# Patient Record
Sex: FEMALE | Race: WHITE | NOT HISPANIC OR LATINO | Employment: FULL TIME | URBAN - NONMETROPOLITAN AREA
[De-identification: names, ages, dates, MRNs, and addresses within clinical notes are randomized per-mention and may not be internally consistent; named-entity substitution may affect disease eponyms.]

---

## 2017-03-24 ENCOUNTER — OFFICE VISIT (OUTPATIENT)
Dept: URGENT CARE | Facility: PHYSICIAN GROUP | Age: 27
End: 2017-03-24
Payer: COMMERCIAL

## 2017-03-24 VITALS
HEIGHT: 64 IN | HEART RATE: 83 BPM | WEIGHT: 125 LBS | TEMPERATURE: 99 F | SYSTOLIC BLOOD PRESSURE: 108 MMHG | BODY MASS INDEX: 21.34 KG/M2 | OXYGEN SATURATION: 98 % | DIASTOLIC BLOOD PRESSURE: 74 MMHG

## 2017-03-24 DIAGNOSIS — R11.0 NAUSEA: ICD-10-CM

## 2017-03-24 DIAGNOSIS — R19.7 NAUSEA VOMITING AND DIARRHEA: Primary | ICD-10-CM

## 2017-03-24 DIAGNOSIS — R11.2 NAUSEA VOMITING AND DIARRHEA: Primary | ICD-10-CM

## 2017-03-24 LAB
APPEARANCE UR: NORMAL
BILIRUB UR STRIP-MCNC: NORMAL MG/DL
COLOR UR AUTO: NORMAL
GLUCOSE UR STRIP.AUTO-MCNC: NORMAL MG/DL
INT CON NEG: NORMAL
INT CON POS: NORMAL
KETONES UR STRIP.AUTO-MCNC: NORMAL MG/DL
LEUKOCYTE ESTERASE UR QL STRIP.AUTO: NORMAL
NITRITE UR QL STRIP.AUTO: NORMAL
PH UR STRIP.AUTO: 6 [PH] (ref 5–8)
POC URINE PREGNANCY TEST: NEGATIVE
PROT UR QL STRIP: NORMAL MG/DL
RBC UR QL AUTO: NORMAL
SP GR UR STRIP.AUTO: 1.02
UROBILINOGEN UR STRIP-MCNC: NORMAL MG/DL

## 2017-03-24 PROCEDURE — 81025 URINE PREGNANCY TEST: CPT | Performed by: PHYSICIAN ASSISTANT

## 2017-03-24 PROCEDURE — 99214 OFFICE O/P EST MOD 30 MIN: CPT | Performed by: PHYSICIAN ASSISTANT

## 2017-03-24 PROCEDURE — 81002 URINALYSIS NONAUTO W/O SCOPE: CPT | Performed by: PHYSICIAN ASSISTANT

## 2017-03-24 RX ORDER — ONDANSETRON 4 MG/1
4 TABLET, FILM COATED ORAL EVERY 8 HOURS PRN
Qty: 20 TAB | Refills: 0 | Status: SHIPPED | OUTPATIENT
Start: 2017-03-24 | End: 2018-03-15

## 2017-03-24 RX ORDER — LOPERAMIDE HYDROCHLORIDE 2 MG/1
2 TABLET ORAL 4 TIMES DAILY PRN
COMMUNITY
End: 2018-03-15

## 2017-03-24 NOTE — MR AVS SNAPSHOT
"        Isabella Regalado   3/24/2017 11:20 AM   Office Visit   MRN: [id number]    Department:  [facility]   Dept Phone:  [phone]    Description:  Female : 1990   Provider:  Jan Muniz PA-C           Reason for Visit     Abdominal Pain abd pain, diarrhea, nausea x3days      Allergies as of 3/24/2017     Allergen Noted Reactions    Acyclovir 2016       Pcn [Penicillins] 2016       Tetracycline 2016         You were diagnosed with     Nausea   [892149]         Vital Signs     Blood Pressure Pulse Temperature Height Weight Body Mass Index    108/74 mmHg 83 37.2 °C (99 °F) 1.626 m (5' 4.02\") 56.7 kg (125 lb) 21.45 kg/m2    Oxygen Saturation Smoking Status                98% Never Smoker           Basic Information     Date Of Birth Sex Race Ethnicity Preferred Language    1990 Female White Non- English      Health Maintenance        Date Due Completion Dates    IMM HEP B VACCINE (1 of 3 - Primary Series) 1989 ---    IMM HEP A VACCINE (1 of 2 - Standard Series) 1990 ---    IMM VARICELLA (CHICKENPOX) VACCINE (1 of 2 - 2 Dose Adolescent Series) 2002 ---    IMM DTaP/Tdap/Td Vaccine (1 - Tdap) 2008 ---    PAP SMEAR 2010 ---    IMM INFLUENZA (1) 2016 ---            Current Immunizations     No immunizations on file.      Below and/or attached are the medications your provider expects you to take. Review all of your home medications and newly ordered medications with your provider and/or pharmacist. Follow medication instructions as directed by your provider and/or pharmacist. Please keep your medication list with you and share with your provider. Update the information when medications are discontinued, doses are changed, or new medications (including over-the-counter products) are added; and carry medication information at all times in the event of emergency situations     Allergies:  ACYCLOVIR - (reactions not documented)     PCN - " (reactions not documented)     TETRACYCLINE - (reactions not documented)               Medications  Valid as of: March 24, 2017 - 12:09 PM    Generic Name Brand Name Tablet Size Instructions for use    Azithromycin (Tab) ZITHROMAX 250 MG Take as directed        Bismuth Subsalicylate   Take  by mouth.        Etonogestrel-Ethinyl Estradiol   Insert  in vagina.        Loperamide HCl (Tab) IMODIUM A-D 2 MG Take 2 mg by mouth 4 times a day as needed for Diarrhea.        Pseudoeph-Doxylamine-DM-APAP   Take  by mouth.        Pseudoephedrine-APAP-DM   Take  by mouth.        .                 Medicines prescribed today were sent to:     [facility] [address] [place] [address], [place] - [address] [facility]    [address] [facility] [place] [address]    Phone: [phone] Fax: [phone]    Open 24 Hours?: No      Medication refill instructions:       If your prescription bottle indicates you have medication refills left, it is not necessary to call your provider’s office. Please contact your pharmacy and they will refill your medication.    If your prescription bottle indicates you do not have any refills left, you may request refills at any time through one of the following ways: The online [employer] system (except Urgent Care), by calling your provider’s office, or by asking your pharmacy to contact your provider’s office with a refill request. Medication refills are processed only during regular business hours and may not be available until the next business day. Your provider may request additional information or to have a follow-up visit with you prior to refilling your medication.   *Please Note: Medication refills are assigned a new Rx number when refilled electronically. Your pharmacy may indicate that no refills were authorized even though a new prescription for the same medication is available at the pharmacy. Please request the medicine by name with the pharmacy before contacting your provider for a refill.           [employer]  Access Code: Activation code not generated  Current MyChart Status: Active

## 2017-03-24 NOTE — PROGRESS NOTES
Chief Complaint   Patient presents with   • Abdominal Pain     abd pain, diarrhea, nausea x3days       HISTORY OF PRESENT ILLNESS: Patient is a 26 y.o. female who presents today because she has a two-day history of nausea, vomiting, diarrhea, some mild body aches, low-grade fevers. She has been able to eat and drink, but only very small sips of water and very small portions of food. This started last 2 days, her last episode of emesis was several hours ago, her diarrhea is 5-7 times daily, described as watery, brown stool without any blood or mucus. She tried some Imodium and some Pepto-Bismol and that did not help    There are no active problems to display for this patient.      Allergies:Acyclovir; Pcn; and Tetracycline    Current Outpatient Prescriptions Ordered in [facility]   Medication Sig Dispense Refill   • loperamide (IMODIUM A-D) 2 MG tablet Take 2 mg by mouth 4 times a day as needed for Diarrhea.     • Bismuth Subsalicylate (PEPTO-BISMOL PO) Take  by mouth.     • Etonogestrel-Ethinyl Estradiol (NUVARING VA) Insert  in vagina.     • ondansetron (ZOFRAN) 4 MG Tab tablet Take 1 Tab by mouth every 8 hours as needed for Nausea/Vomiting. 20 Tab 0   • Pseudoeph-Doxylamine-DM-APAP (NYQUIL PO) Take  by mouth.     • Pseudoephedrine-APAP-DM (DAYQUIL PO) Take  by mouth.     • azithromycin (ZITHROMAX) 250 MG Tab Take as directed (Patient not taking: Reported on 3/24/2017) 6 Tab 0     No current Epic-ordered facility-administered medications on file.       No past medical history on file.    Social History   Substance Use Topics   • Smoking status: Never Smoker    • Smokeless tobacco: Never Used   • Alcohol Use: No       No family status information on file.   No family history on file.    ROS:  Review of Systems   Constitutional: Positive for fever, chills, myalgias and malaise/fatigue.   HENT: Negative for ear pain, nosebleeds, congestion, sore throat and neck pain.    Eyes: Negative for blurred vision.   Respiratory:  "Negative for cough, sputum production, shortness of breath and wheezing.    Cardiovascular: Negative for chest pain, palpitations, orthopnea and leg swelling.   Gastrointestinal: Negative for heartburn, positive for diarrhea, nausea, vomiting and generalized cramping abdominal pain.   Genitourinary: Negative for dysuria, urgency and frequency.     Exam:  Blood pressure 108/74, pulse 83, temperature 37.2 °C (99 °F), height 1.626 m (5' 4.02\"), weight 56.7 kg (125 lb), SpO2 98 %.  General:  Well nourished, well developed female in NAD  Head:Normocephalic, atraumatic  Eyes: PERRLA, EOM within normal limits, no conjunctival injection, no scleral icterus, visual fields and acuity grossly intact.  Nose: Symmetrical without tenderness, no discharge.  Mouth: reasonable hygiene, no erythema exudates or tonsillar enlargement.  Neck: no masses, range of motion within normal limits, no tracheal deviation. No obvious thyroid enlargement.  Pulmonary: chest is symmetrical with respiration, no wheezes, crackles, or rhonchi.  Cardiovascular: regular rate and rhythm without murmurs, rubs, or gallops.  Abdomen: Nondistended, bowel tones in all 4 quadrants, soft, mild generalized tenderness to palpation, no other tenderness to palpation, no organomegaly, rebound, referred rebound tenderness, no Heath's or McBurney's point tenderness.  Extremities: no clubbing, cyanosis, or edema.    Negative urine hCG. Urinalysis shows trace protein, large amount of blood, moderate amount of ketones.    Please note that this dictation was created using voice recognition software. I have made every reasonable attempt to correct obvious errors, but I expect that there are errors of grammar and possibly content that I did not discover before finalizing the note.    Assessment/Plan:  1. Nausea vomiting and diarrhea     2. Nausea  POCT Urinalysis    POCT Pregnancy    ondansetron (ZOFRAN) 4 MG Tab tablet    urine abnormalities can be explained by recent " emesis and illness. Discussed bland diet, small sips of water.    Followup with primary care in the next 7-10 days if not significantly improving, return to the urgent care or go to the emergency room sooner for any worsening of symptoms.

## 2017-03-24 NOTE — Clinical Note
March 24, 2017         Patient: Isabella Regalado   YOB: 1990   Date of Visit: 3/24/2017           To Whom it May Concern:    Isabella Regalado was seen in my clinic on 3/24/2017. She may return to work on 03/25/2017, if feeling better, otherwise on 3/27/17. Please excuse any absences due to illness    If you have any questions or concerns, please don't hesitate to call.        Sincerely,           Jan Muniz PA-C  Electronically Signed

## 2018-03-09 ENCOUNTER — OFFICE VISIT (OUTPATIENT)
Dept: URGENT CARE | Facility: PHYSICIAN GROUP | Age: 28
End: 2018-03-09
Payer: COMMERCIAL

## 2018-03-09 VITALS
HEIGHT: 64 IN | WEIGHT: 135 LBS | TEMPERATURE: 98 F | DIASTOLIC BLOOD PRESSURE: 80 MMHG | BODY MASS INDEX: 23.05 KG/M2 | HEART RATE: 101 BPM | OXYGEN SATURATION: 98 % | RESPIRATION RATE: 20 BRPM | SYSTOLIC BLOOD PRESSURE: 120 MMHG

## 2018-03-09 DIAGNOSIS — F41.9 ANXIETY: ICD-10-CM

## 2018-03-09 PROCEDURE — 99214 OFFICE O/P EST MOD 30 MIN: CPT | Performed by: PHYSICIAN ASSISTANT

## 2018-03-09 RX ORDER — HYDROXYZINE HYDROCHLORIDE 25 MG/1
25 TABLET, FILM COATED ORAL 3 TIMES DAILY PRN
Qty: 30 TAB | Refills: 0 | Status: SHIPPED | OUTPATIENT
Start: 2018-03-09 | End: 2018-05-07 | Stop reason: SDUPTHER

## 2018-03-09 NOTE — PROGRESS NOTES
Chief Complaint   Patient presents with   • Anxiety     x5days has OV 3/15       HISTORY OF PRESENT ILLNESS: Patient is a 27 y.o. female who presents today because she has anxiety. She has had it as a teenager and with her counseling but was doing quite well up until a few days ago when she started realizing that a recent job promotion was going to be a lot more responsibility. She has tearfulness, impending down, and shakiness. Denies any homicidal or suicidal ideation. She has not been taking any medications for her symptoms.    There are no active problems to display for this patient.      Allergies:Acyclovir; Pcn [penicillins]; and Tetracycline    Current Outpatient Prescriptions Ordered in [facility]   Medication Sig Dispense Refill   • MethylPREDNISolone Acetate (DEPO-MEDROL INJ) by Injection route.     • hydrOXYzine HCl (ATARAX) 25 MG Tab Take 1 Tab by mouth 3 times a day as needed. 30 Tab 0   • loperamide (IMODIUM A-D) 2 MG tablet Take 2 mg by mouth 4 times a day as needed for Diarrhea.     • Bismuth Subsalicylate (PEPTO-BISMOL PO) Take  by mouth.     • Etonogestrel-Ethinyl Estradiol (NUVARING VA) Insert  in vagina.     • ondansetron (ZOFRAN) 4 MG Tab tablet Take 1 Tab by mouth every 8 hours as needed for Nausea/Vomiting. 20 Tab 0   • Pseudoeph-Doxylamine-DM-APAP (NYQUIL PO) Take  by mouth.     • Pseudoephedrine-APAP-DM (DAYQUIL PO) Take  by mouth.     • azithromycin (ZITHROMAX) 250 MG Tab Take as directed (Patient not taking: Reported on 3/24/2017) 6 Tab 0     No current Epic-ordered facility-administered medications on file.        History reviewed. No pertinent past medical history.    Social History   Substance Use Topics   • Smoking status: Never Smoker   • Smokeless tobacco: Never Used   • Alcohol use No       No family status information on file.   History reviewed. No pertinent family history.    ROS:  Review of Systems   Constitutional: Negative for fever, chills, weight loss and malaise/fatigue.   HENT:  "Negative for ear pain, nosebleeds, congestion, sore throat and neck pain.    Eyes: Negative for blurred vision.   Respiratory: Negative for cough, sputum production, shortness of breath and wheezing.    Cardiovascular: Negative for chest pain, positive for palpitations, orthopnea and leg swelling.   Gastrointestinal: Negative for heartburn, nausea, vomiting and abdominal pain.   Genitourinary: Negative for dysuria, urgency and frequency.     Exam:  Blood pressure 120/80, pulse (!) 101, temperature 36.7 °C (98 °F), resp. rate 20, height 1.626 m (5' 4.02\"), weight 61.2 kg (135 lb), SpO2 98 %, not currently breastfeeding.  General:  Well nourished, well developed female, tearful, in the office  Head:Normocephalic, atraumatic  Eyes: PERRLA, EOM within normal limits, no conjunctival injection, no scleral icterus, visual fields and acuity grossly intact.  Extremities: no clubbing, cyanosis, or edema.    Please note that this dictation was created using voice recognition software. I have made every reasonable attempt to correct obvious errors, but I expect that there are errors of grammar and possibly content that I did not discover before finalizing the note.    Assessment/Plan:  1. Anxiety  hydrOXYzine HCl (ATARAX) 25 MG Tab   . She has an appointment with her primary care provider in 6 days.    Followup with primary care in the next 7-10 days if not significantly improving, return to the urgent care or go to the emergency room sooner for any worsening of symptoms.       "

## 2018-03-15 ENCOUNTER — OFFICE VISIT (OUTPATIENT)
Dept: MEDICAL GROUP | Facility: PHYSICIAN GROUP | Age: 28
End: 2018-03-15
Payer: COMMERCIAL

## 2018-03-15 VITALS
HEIGHT: 64 IN | OXYGEN SATURATION: 97 % | HEART RATE: 74 BPM | RESPIRATION RATE: 18 BRPM | DIASTOLIC BLOOD PRESSURE: 66 MMHG | TEMPERATURE: 98.6 F | SYSTOLIC BLOOD PRESSURE: 136 MMHG | BODY MASS INDEX: 23.58 KG/M2 | WEIGHT: 138.1 LBS

## 2018-03-15 DIAGNOSIS — Z82.79 FAMILY HISTORY OF CONGENITAL HEART DISEASE IN FATHER: ICD-10-CM

## 2018-03-15 DIAGNOSIS — F41.9 ANXIETY: ICD-10-CM

## 2018-03-15 DIAGNOSIS — Z30.09 BIRTH CONTROL COUNSELING: ICD-10-CM

## 2018-03-15 PROCEDURE — 99214 OFFICE O/P EST MOD 30 MIN: CPT | Performed by: NURSE PRACTITIONER

## 2018-03-15 RX ORDER — MEDROXYPROGESTERONE ACETATE 150 MG/ML
150 INJECTION, SUSPENSION INTRAMUSCULAR ONCE
Qty: 1 ML | Refills: 0
Start: 2018-03-15 | End: 2018-07-23 | Stop reason: SDUPTHER

## 2018-03-15 RX ORDER — CITALOPRAM HYDROBROMIDE 10 MG/1
10 TABLET ORAL DAILY
Qty: 30 TAB | Refills: 2 | Status: SHIPPED | OUTPATIENT
Start: 2018-03-15 | End: 2018-05-07

## 2018-03-15 ASSESSMENT — PATIENT HEALTH QUESTIONNAIRE - PHQ9: CLINICAL INTERPRETATION OF PHQ2 SCORE: 0

## 2018-03-15 NOTE — PROGRESS NOTES
[facility]  Primary Care Office Visit - Problem-Oriented        History:     Isabella Regalado is a 27 y.o. female who is here today to discuss Anxiety and Establish Care      Anxiety  Patient is a 27-year-old female who is here for follow-up of anxiety. She was originally seen in the urgent care about a week ago and started on Atarax 25 mg as needed, she does report that this helps to lessen the panic and physical manifestations of her anxiety. She has had anxiety for most of her life, first diagnosed as a teen, her mother also has anxiety and bipolar depression. She saw a counselor in high school, and this did prove beneficial and she is interested in seeking out a counselor again. Her anxiety affects her sleep and her ability to work, it also affects her relationship with her spouse. She thinks that her new job promotion has heightened her anxiety. Occasionally has palpitations during episodes of panic. She denies syncope, chest pain. She denies suicidal or homicidal ideations. She denies episodes of extended week intervals and not needing sleep, excessive spending, risky sexual behavior. We did discuss referral to behavioral health as well as starting Celexa 10 mg daily, continue Atarax as needed for panic.      Family history of congenital heart disease in father  Patient reports a strong family history of congenital heart defect, coarctation of the aorta affecting only the males in the family, she has not had echo and we did discuss a referral for this today. She denies syncope, occasionally she does have palpitations though she thinks that these are triggered by her anxiety alone, she has no chest pain, extreme fatigue or exertional dyspnea.        Past Medical History:   Diagnosis Date   • Anxiety      Past Surgical History:   Procedure Laterality Date   • DENTAL EXTRACTION(S)       Social History     Social History   • Marital status:      Spouse name: N/A   • Number of children: N/A   •  "Years of education: N/A     Occupational History   • Not on file.     Social History Main Topics   • Smoking status: Never Smoker   • Smokeless tobacco: Never Used   • Alcohol use No   • Drug use: No   • Sexual activity: Yes     Birth control/ protection: Injection     Other Topics Concern   • Not on file     Social History Narrative   • No narrative on file     History   Smoking Status   • Never Smoker   Smokeless Tobacco   • Never Used     Family History   Problem Relation Age of Onset   • Psychiatry Mother      bipolar depression    • Other Mother      parkinsons    • Heart Disease Father      26 - congenital heart defect    • Other Sister      marfans    • Other Brother      marfans      Allergies   Allergen Reactions   • Acyclovir    • Pcn [Penicillins]    • Tetracycline        Problem List:     Patient Active Problem List    Diagnosis Date Noted   • Anxiety 03/15/2018   • Family history of congenital heart disease in father 03/15/2018         Medications:     Current Outpatient Prescriptions:   •  citalopram (CELEXA) 10 MG tablet, Take 1 Tab by mouth every day., Disp: 30 Tab, Rfl: 2  •  medroxyPROGESTERone (DEPO-PROVERA) 150 MG/ML Suspension, 1 mL by Intramuscular route Once for 1 dose., Disp: 1 mL, Rfl: 0  •  MethylPREDNISolone Acetate (DEPO-MEDROL INJ), by Injection route., Disp: , Rfl:   •  hydrOXYzine HCl (ATARAX) 25 MG Tab, Take 1 Tab by mouth 3 times a day as needed., Disp: 30 Tab, Rfl: 0      Review of Systems:     Pertinent positives as per HPI, all other systems reviewed and WNL     Physical Assessment:     VS: /66   Pulse 74   Temp 37 °C (98.6 °F)   Resp 18   Ht 1.626 m (5' 4.02\")   Wt 62.6 kg (138 lb 1.6 oz)   SpO2 97%   Breastfeeding? No   BMI 23.69 kg/m²     General: Well-developed, well-nourished, female     Head: PERRL, EOMI. Normocephalic. No facial asymmetry noted.  Neck: Neck supple, no thyromegaly  Cardiovasc: RRR, no MRG. No thrills or bruits. Pulses 2+ and symmetric at all " distal extremities.  Pulmonary: Lungs clear bilaterally.  Normal respiratory effort. No wheeze or crackles.   Neuro: Alert and oriented  Skin:No rashes noted. Skin warm, dry, intact    Psych: Dressed appropriately for the weather, pleasant and conversant.  Affect, mood & judgment appropriate.      Recent Labs & Imaging:   ADRIAN-7 score 13   MDQ highly suggestive of mood disorder    Assessment/Plan:   Isabella was seen today for anxiety and establish care.    Diagnoses and all orders for this visit:    Anxiety, uncontrolled   - Discussed generalized anxiety disorder with the patient, recommend Celexa 10 mg daily, Atarax as needed. I have also placed a referral to behavioral health for counseling. Her MDQ is suggestive of mood disorder, consider referral to psychiatry in the future should the SSRI failed to improve her symptoms.  -     REFERRAL TO BEHAVIORAL HEALTH  -     citalopram (CELEXA) 10 MG tablet; Take 1 Tab by mouth every day.  -     TSH; Future  -     FREE THYROXINE; Future    Family history of congenital heart disease in father  -     ECHOCARDIOGRAM COMP W/O CONT; Future    Birth control counseling  -     medroxyPROGESTERone (DEPO-PROVERA) 150 MG/ML Suspension; 1 mL by Intramuscular route Once for 1 dose.      Patient is agreeable to the above plan and voiced understanding. All questions answered.     Please note that this dictation was created using voice recognition software. I have made every reasonable attempt to correct obvious errors, but I expect that there are errors of grammar and possibly content that I did not discover before finalizing the note.      BEBE Degroot  3/15/2018, 2:42 PM

## 2018-03-15 NOTE — ASSESSMENT & PLAN NOTE
Patient is a 27-year-old female who is here for follow-up of anxiety. She was originally seen in the urgent care about a week ago and started on Atarax 25 mg as needed, she does report that this helps to lessen the panic and physical manifestations of her anxiety. She has had anxiety for most of her life, first diagnosed as a teen, her mother also has anxiety and bipolar depression. She saw a counselor in high school, and this did prove beneficial and she is interested in seeking out a counselor again. Her anxiety affects her sleep and her ability to work, it also affects her relationship with her spouse. She thinks that her new job promotion has heightened her anxiety. Occasionally has palpitations during episodes of panic. She denies syncope, chest pain. She denies suicidal or homicidal ideations. She denies episodes of extended week intervals and not needing sleep, excessive spending, risky sexual behavior. We did discuss referral to behavioral health as well as starting Celexa 10 mg daily, continue Atarax as needed for panic.

## 2018-03-15 NOTE — ASSESSMENT & PLAN NOTE
Patient reports a strong family history of congenital heart defect, coarctation of the aorta affecting only the males in the family, she has not had echo and we did discuss a referral for this today. She denies syncope, occasionally she does have palpitations though she thinks that these are triggered by her anxiety alone, she has no chest pain, extreme fatigue or exertional dyspnea.

## 2018-05-07 ENCOUNTER — OFFICE VISIT (OUTPATIENT)
Dept: MEDICAL GROUP | Facility: PHYSICIAN GROUP | Age: 28
End: 2018-05-07
Payer: COMMERCIAL

## 2018-05-07 VITALS
RESPIRATION RATE: 16 BRPM | WEIGHT: 135 LBS | DIASTOLIC BLOOD PRESSURE: 80 MMHG | OXYGEN SATURATION: 99 % | TEMPERATURE: 98.5 F | HEART RATE: 84 BPM | BODY MASS INDEX: 23.05 KG/M2 | SYSTOLIC BLOOD PRESSURE: 128 MMHG | HEIGHT: 64 IN

## 2018-05-07 DIAGNOSIS — Z30.42 ENCOUNTER FOR SURVEILLANCE OF INJECTABLE CONTRACEPTIVE: ICD-10-CM

## 2018-05-07 DIAGNOSIS — F41.9 ANXIETY: ICD-10-CM

## 2018-05-07 LAB
INT CON NEG: NEGATIVE
INT CON POS: POSITIVE
POC URINE PREGNANCY TEST: NORMAL

## 2018-05-07 PROCEDURE — 81025 URINE PREGNANCY TEST: CPT | Performed by: NURSE PRACTITIONER

## 2018-05-07 PROCEDURE — 99214 OFFICE O/P EST MOD 30 MIN: CPT | Mod: 25 | Performed by: NURSE PRACTITIONER

## 2018-05-07 RX ORDER — HYDROXYZINE HYDROCHLORIDE 25 MG/1
TABLET, FILM COATED ORAL
Qty: 45 TAB | Refills: 0 | Status: SHIPPED | OUTPATIENT
Start: 2018-05-07 | End: 2019-05-23 | Stop reason: SDUPTHER

## 2018-05-07 RX ORDER — CITALOPRAM 20 MG/1
20 TABLET ORAL DAILY
Qty: 30 TAB | Refills: 2 | Status: SHIPPED | OUTPATIENT
Start: 2018-05-07 | End: 2018-06-05 | Stop reason: SDUPTHER

## 2018-05-07 RX ORDER — MEDROXYPROGESTERONE ACETATE 150 MG/ML
150 INJECTION, SUSPENSION INTRAMUSCULAR ONCE
Status: COMPLETED | OUTPATIENT
Start: 2018-05-07 | End: 2018-05-07

## 2018-05-07 RX ADMIN — MEDROXYPROGESTERONE ACETATE 150 MG: 150 INJECTION, SUSPENSION INTRAMUSCULAR at 12:17

## 2018-05-07 NOTE — PROGRESS NOTES
Chief Complaint   Patient presents with   • Anxiety     discuss celexa         This is a 27 y.o.female patient that presents today with the following: Follow-up visit, discuss medications, Depo-Provera injection    Anxiety  This is a chronic condition, suboptimally controlled on current dose of citalopram. She is on 10 mg, but she is ready to increase the dose to 20 mg daily. She was also started on hydroxyzine 25 mg as needed for breakthrough anxiety and panic attack. She is out of the hydroxyzine, and is requesting refill. She is tolerating the citalopram well with no significant bothersome side effects. New prescription for 20 mg pill has been called in for her. Hydroxyzine has also been refilled, she is to take one half to one full pill up to 3 times a day as needed for breakthrough anxiety/panic attack. She is to follow up with her PCP and keep her upcoming appointment with behavioral health.    Encounter for surveillance of injectable contraceptive  Patient reports to be due for her Depo-Provera injections for ongoing contraception. She states that she was having this done at [facility] but would like to have it done through her primary care provider. Reports to be due now, but we do not have outside record. We'll get an office pregnancy test, of note this was negative. She was given Depo-Provera shot today. She will be due again between 12 and 13 weeks. She tolerates this well with no significant bothersome side effects.      No visits with results within 1 Month(s) from this visit.   Latest known visit with results is:   Office Visit on 03/24/2017   Component Date Value   • POC Color 03/24/2017 yellow/pink    • POC Appearance 03/24/2017 cloudy    • POC Leukocyte Esterase 03/24/2017 neg    • POC Nitrites 03/24/2017 neg    • POC Urobiligen 03/24/2017 neg    • POC Protein 03/24/2017 trace    • POC Urine PH 03/24/2017 6.0    • POC Blood 03/24/2017 large    • POC Specific Gravity 03/24/2017 1.025    • POC  "Ketones 03/24/2017 large    • POC Bilirubin 03/24/2017 neg    • POC Glucose 03/24/2017 neg    • POC Urine Pregnancy Test 03/24/2017 negative    • Internal Control Positive 03/24/2017 Valid    • Internal Control Negative 03/24/2017 Valid          clinical course has been stable    Past Medical History:   Diagnosis Date   • Anxiety        Past Surgical History:   Procedure Laterality Date   • DENTAL EXTRACTION(S)         Family History   Problem Relation Age of Onset   • Psychiatry Mother      bipolar depression    • Other Mother      parkinsons    • Heart Disease Father      26 - congenital heart defect    • Other Sister      marfans    • Other Brother      marfans        Acyclovir; Pcn [penicillins]; and Tetracycline    Current Outpatient Prescriptions Ordered in [facility]   Medication Sig Dispense Refill   • citalopram (CELEXA) 20 MG Tab Take 1 Tab by mouth every day. 30 Tab 2   • hydrOXYzine HCl (ATARAX) 25 MG Tab Take 1/2 to 1 pill up to 3 times daily as needed 45 Tab 0     No current Epic-ordered facility-administered medications on file.        Constitutional ROS: No unexpected change in weight, No fatigue, No unexplained fevers, sweats, or chills  Pulmonary ROS: No chronic cough, sputum, or hemoptysis, No shortness of breath, No recent change in breathing  Cardiovascular ROS: No chest pain, No edema, No palpitations  Gastrointestinal ROS: No abdominal pain, No nausea, vomiting, diarrhea, or constipation  Musculoskeletal/Extremities ROS: No clubbing, No peripheral edema, No pain, redness or swelling on the joints  Neurologic ROS: Normal development, No seizures, No weakness  Psychiatric ROS: positive per HPI  Genitourinary ROS: positive per HPI    Physical exam:  /80   Pulse 84   Temp 36.9 °C (98.5 °F)   Resp 16   Ht 1.626 m (5' 4\")   Wt 61.2 kg (135 lb)   SpO2 99%   BMI 23.17 kg/m²   General Appearance: young female, alert, no distress, well nourished, well groomed  Skin: Skin color, texture, turgor " normal. No rashes or lesions.  Lungs: negative findings: normal respiratory rate and rhythm, lungs clear to auscultation  Heart: negative. RRR without murmur, gallop, or rubs.  No ectopy.  Abdomen: Abdomen soft, non-tender. BS normal. No masses,  No organomegaly  Musculoskeletal: negative findings: ROM of all joints is normal, strength normal, no deformities present  Neurologic: intact, oriented, mood appropriate, judgement intact, cranial nerves 2-12 grossly intact.     Medical decision making/discussion: Patient to follow-up with her regular PCP in the next 6-8 weeks. We'll increase citalopram to 20 mg daily and refill hydroxyzine as discussed above. She will be given Depo-Provera injection today, her next one will be due in 12-13 weeks. She is to keep her upcoming appointment with behavioral health as well.    Isabella was seen today for anxiety.    Diagnoses and all orders for this visit:    Anxiety  -     citalopram (CELEXA) 20 MG Tab; Take 1 Tab by mouth every day.  -     hydrOXYzine HCl (ATARAX) 25 MG Tab; Take 1/2 to 1 pill up to 3 times daily as needed    Encounter for surveillance of injectable contraceptive  -     POCT Pregnancy  -     medroxyPROGESTERone (DEPO-PROVERA) injection 150 mg; 1 mL by Intramuscular route Once.          Please note that this dictation was created using voice recognition software. I have made every reasonable attempt to correct obvious errors, but I expect that there are errors of grammar and possibly content that I did not discover before finalizing the note.

## 2018-05-07 NOTE — ASSESSMENT & PLAN NOTE
This is a chronic condition, suboptimally controlled on current dose of citalopram. She is on 10 mg, but she is ready to increase the dose to 20 mg daily. She was also started on hydroxyzine 25 mg as needed for breakthrough anxiety and panic attack. She is out of the hydroxyzine, and is requesting refill. She is tolerating the citalopram well with no significant bothersome side effects. New prescription for 20 mg pill has been called in for her. Hydroxyzine has also been refilled, she is to take one half to one full pill up to 3 times a day as needed for breakthrough anxiety/panic attack. She is to follow up with her PCP and keep her upcoming appointment with behavioral health.

## 2018-05-07 NOTE — ASSESSMENT & PLAN NOTE
Patient reports to be due for her Depo-Provera injections for ongoing contraception. She states that she was having this done at [facility] but would like to have it done through her primary care provider. Reports to be due now, but we do not have outside record. We'll get an office pregnancy test, of note this was negative. She was given Depo-Provera shot today. She will be due again between 12 and 13 weeks. She tolerates this well with no significant bothersome side effects.

## 2018-05-07 NOTE — PATIENT INSTRUCTIONS
Will have you increase the citalopram to 20 mg daily and will refilled the hydroxyzine for breakthrough anxiety--take 2 pill of what you have on hand until finished    In office pregnancy test, Depo shot    Follow up with Darlyn

## 2018-06-05 ENCOUNTER — OFFICE VISIT (OUTPATIENT)
Dept: MEDICAL GROUP | Facility: PHYSICIAN GROUP | Age: 28
End: 2018-06-05
Payer: COMMERCIAL

## 2018-06-05 VITALS
HEIGHT: 64 IN | TEMPERATURE: 98.6 F | HEART RATE: 75 BPM | WEIGHT: 135.4 LBS | RESPIRATION RATE: 16 BRPM | OXYGEN SATURATION: 100 % | BODY MASS INDEX: 23.12 KG/M2 | SYSTOLIC BLOOD PRESSURE: 120 MMHG | DIASTOLIC BLOOD PRESSURE: 76 MMHG

## 2018-06-05 DIAGNOSIS — F41.9 ANXIETY: ICD-10-CM

## 2018-06-05 DIAGNOSIS — Z00.00 HEALTH CARE MAINTENANCE: ICD-10-CM

## 2018-06-05 PROCEDURE — 99214 OFFICE O/P EST MOD 30 MIN: CPT | Performed by: NURSE PRACTITIONER

## 2018-06-05 RX ORDER — CITALOPRAM 20 MG/1
20 TABLET ORAL DAILY
Qty: 90 TAB | Refills: 1 | Status: SHIPPED | OUTPATIENT
Start: 2018-06-05 | End: 2018-11-29 | Stop reason: SDUPTHER

## 2018-06-05 NOTE — PROGRESS NOTES
[facility]  Primary Care Office Visit - Problem-Oriented        History:     Isabella Regalado is a 27 y.o. female who is here today to discuss Anxiety (FV)      Anxiety  Patient is a 26 y/o female with anxiety here for follow up. She reports that she is doing well with increased dose of celexa, now at 20mg daily. Less anxiety and panic attacks. Has not had to use hydroxyzine since starting the increased dose.       Health care maintenance  PAP 2016 - normal           Past Medical History:   Diagnosis Date   • Anxiety      Past Surgical History:   Procedure Laterality Date   • DENTAL EXTRACTION(S)       Social History     Social History   • Marital status:      Spouse name: N/A   • Number of children: N/A   • Years of education: N/A     Occupational History   • Not on file.     Social History Main Topics   • Smoking status: Never Smoker   • Smokeless tobacco: Never Used   • Alcohol use No   • Drug use: No   • Sexual activity: Yes     Birth control/ protection: Injection     Other Topics Concern   • Not on file     Social History Narrative   • No narrative on file     History   Smoking Status   • Never Smoker   Smokeless Tobacco   • Never Used     Family History   Problem Relation Age of Onset   • Psychiatry Mother      bipolar depression    • Other Mother      parkinsons    • Heart Disease Father      26 - congenital heart defect    • Other Sister      marfans    • Other Brother      marfans      Allergies   Allergen Reactions   • Acyclovir    • Pcn [Penicillins]    • Tetracycline        Problem List:     Patient Active Problem List    Diagnosis Date Noted   • Health care maintenance 06/05/2018   • Encounter for surveillance of injectable contraceptive 05/07/2018   • Anxiety 03/15/2018   • Family history of congenital heart disease in father 03/15/2018         Medications:     Current Outpatient Prescriptions:   •  citalopram (CELEXA) 20 MG Tab, Take 1 Tab by mouth every day., Disp: 90 Tab, Rfl:  "1  •  hydrOXYzine HCl (ATARAX) 25 MG Tab, Take 1/2 to 1 pill up to 3 times daily as needed, Disp: 45 Tab, Rfl: 0      Review of Systems:     Pertinent positives as per HPI, all other systems reviewed and WNL     Physical Assessment:     VS: /76   Pulse 75   Temp 37 °C (98.6 °F)   Resp 16   Ht 1.626 m (5' 4.02\")   Wt 61.4 kg (135 lb 6.4 oz)   SpO2 100%   Breastfeeding? No   BMI 23.23 kg/m²     General: Well-developed, well-nourished, female     Head: PERRL, EOMI. Normocephalic. No facial asymmetry noted.  Cardiovasc:  RRR, no MRG. No thrills or bruits. Pulses 2+ and symmetric at all distal extremities.  Pulmonary: Lungs clear bilaterally.  Normal respiratory effort. No wheeze or crackles.   Neuro: Alert and oriented, CNs II-XII intact, no focal deficits.  Skin:No rashes noted. Skin warm, dry, intact    Psych: Dressed appropriately for the weather, pleasant and conversant.  Affect, mood & judgment appropriate.      Assessment/Plan:   Isabella was seen today for anxiety.    Diagnoses and all orders for this visit:    Anxiety, ongoing, improving   - discussed stress/anxiety management techniques, continue celexa 20mg daily, hydroxyzine PRN. Follow up in 6 months, sooner if needed   -     citalopram (CELEXA) 20 MG Tab; Take 1 Tab by mouth every day.    Health care maintenance  - follow up in 6 months for PAP      Patient is agreeable to the above plan and voiced understanding. All questions answered.     Please note that this dictation was created using voice recognition software. I have made every reasonable attempt to correct obvious errors, but I expect that there are errors of grammar and possibly content that I did not discover before finalizing the note.      BEBE Degroot  6/5/2018, 10:41 AM  "

## 2018-06-05 NOTE — ASSESSMENT & PLAN NOTE
Patient is a 28 y/o female with anxiety here for follow up. She reports that she is doing well with increased dose of celexa, now at 20mg daily. Less anxiety and panic attacks. Has not had to use hydroxyzine since starting the increased dose.

## 2018-07-23 ENCOUNTER — TELEPHONE (OUTPATIENT)
Dept: MEDICAL GROUP | Facility: PHYSICIAN GROUP | Age: 28
End: 2018-07-23

## 2018-07-23 ENCOUNTER — NON-PROVIDER VISIT (OUTPATIENT)
Dept: MEDICAL GROUP | Facility: PHYSICIAN GROUP | Age: 28
End: 2018-07-23
Payer: COMMERCIAL

## 2018-07-23 DIAGNOSIS — Z30.09 BIRTH CONTROL COUNSELING: ICD-10-CM

## 2018-07-23 DIAGNOSIS — Z30.8 ENCOUNTER FOR OTHER CONTRACEPTIVE MANAGEMENT: ICD-10-CM

## 2018-07-23 PROCEDURE — 96372 THER/PROPH/DIAG INJ SC/IM: CPT | Performed by: NURSE PRACTITIONER

## 2018-07-23 RX ORDER — MEDROXYPROGESTERONE ACETATE 150 MG/ML
150 INJECTION, SUSPENSION INTRAMUSCULAR ONCE
Qty: 1 ML | Refills: 5 | Status: SHIPPED | OUTPATIENT
Start: 2018-07-23 | End: 2018-07-23

## 2018-07-23 RX ORDER — MEDROXYPROGESTERONE ACETATE 150 MG/ML
150 INJECTION, SUSPENSION INTRAMUSCULAR ONCE
Status: COMPLETED | OUTPATIENT
Start: 2018-07-23 | End: 2018-07-23

## 2018-07-23 RX ADMIN — MEDROXYPROGESTERONE ACETATE 150 MG: 150 INJECTION, SUSPENSION INTRAMUSCULAR at 15:47

## 2018-07-23 NOTE — TELEPHONE ENCOUNTER
1. Caller Name: Pt                      Call Back Number: [phone] (home) [phone] (work)    2. Message: Pt called in requesting new Rx for Depo be sent to the pharmacy so she may get this done this month. No active Rx in chart.    3. Patient approves office to leave a detailed voicemail/MyChart message: no and N\A

## 2018-07-23 NOTE — PROGRESS NOTES
Isabella Regalado is a 27 y.o. here for a Depo Provera Injection.     Date of last Depo Provera Injection: 5/7/18  Current date within therapeutic range?: Yes   Urine pregnancy test done (needed if out of date range): no  Date of office visit:7/23/18  Date of last pap (if > 21 years old)/ GYN exam: 5/7/18  Dx: Contraceptive use    Order and dose verified by: Darlyn Evans  Patient tolerated injection and no adverse effects were observed or reported: Yes    # of Administrations remaining in MAR: 5  Next injection due between 10/8/18 and 10/22/18.

## 2018-11-29 DIAGNOSIS — F41.9 ANXIETY: ICD-10-CM

## 2018-11-30 RX ORDER — CITALOPRAM 20 MG/1
TABLET ORAL
Qty: 90 TAB | Refills: 0 | Status: SHIPPED | OUTPATIENT
Start: 2018-11-30 | End: 2018-12-05

## 2018-12-05 ENCOUNTER — OFFICE VISIT (OUTPATIENT)
Dept: MEDICAL GROUP | Facility: PHYSICIAN GROUP | Age: 28
End: 2018-12-05
Payer: COMMERCIAL

## 2018-12-05 ENCOUNTER — HOSPITAL ENCOUNTER (OUTPATIENT)
Facility: MEDICAL CENTER | Age: 28
End: 2018-12-05
Attending: NURSE PRACTITIONER
Payer: COMMERCIAL

## 2018-12-05 VITALS
RESPIRATION RATE: 18 BRPM | SYSTOLIC BLOOD PRESSURE: 110 MMHG | WEIGHT: 138 LBS | HEIGHT: 64 IN | DIASTOLIC BLOOD PRESSURE: 60 MMHG | TEMPERATURE: 99.1 F | OXYGEN SATURATION: 97 % | HEART RATE: 83 BPM | BODY MASS INDEX: 23.56 KG/M2

## 2018-12-05 DIAGNOSIS — N94.9 CERVICAL MOTION TENDERNESS: ICD-10-CM

## 2018-12-05 DIAGNOSIS — Z01.419 WELL WOMAN EXAM WITH ROUTINE GYNECOLOGICAL EXAM: Primary | ICD-10-CM

## 2018-12-05 DIAGNOSIS — Z30.42 ENCOUNTER FOR SURVEILLANCE OF INJECTABLE CONTRACEPTIVE: ICD-10-CM

## 2018-12-05 DIAGNOSIS — F41.9 ANXIETY: ICD-10-CM

## 2018-12-05 LAB
INT CON NEG: NORMAL
INT CON POS: NORMAL
POC URINE PREGNANCY TEST: NEGATIVE

## 2018-12-05 PROCEDURE — 81025 URINE PREGNANCY TEST: CPT | Performed by: NURSE PRACTITIONER

## 2018-12-05 PROCEDURE — 99395 PREV VISIT EST AGE 18-39: CPT | Mod: 25 | Performed by: NURSE PRACTITIONER

## 2018-12-05 PROCEDURE — 87660 TRICHOMONAS VAGIN DIR PROBE: CPT

## 2018-12-05 PROCEDURE — 88175 CYTOPATH C/V AUTO FLUID REDO: CPT

## 2018-12-05 PROCEDURE — 87591 N.GONORRHOEAE DNA AMP PROB: CPT

## 2018-12-05 PROCEDURE — 87480 CANDIDA DNA DIR PROBE: CPT

## 2018-12-05 PROCEDURE — 87510 GARDNER VAG DNA DIR PROBE: CPT

## 2018-12-05 PROCEDURE — 87491 CHLMYD TRACH DNA AMP PROBE: CPT

## 2018-12-05 RX ORDER — VILAZODONE HYDROCHLORIDE 20 MG/1
20 TABLET ORAL DAILY
Qty: 30 TAB | Refills: 5 | Status: SHIPPED | OUTPATIENT
Start: 2018-12-05 | End: 2019-05-23 | Stop reason: SDUPTHER

## 2018-12-05 RX ORDER — MEDROXYPROGESTERONE ACETATE 150 MG/ML
150 INJECTION, SUSPENSION INTRAMUSCULAR ONCE
Status: COMPLETED | OUTPATIENT
Start: 2018-12-05 | End: 2018-12-05

## 2018-12-05 RX ADMIN — MEDROXYPROGESTERONE ACETATE 150 MG: 150 INJECTION, SUSPENSION INTRAMUSCULAR at 12:00

## 2018-12-05 NOTE — ASSESSMENT & PLAN NOTE
Patient reports having some sexual dysfunction, she saw due to her Depo-Provera injections so she stopped taking it times 2 months.  Sexual dysfunction did not improve.  We did discuss that this is likely related to her SSRI.  She would like to resume Depo today as this helped significantly with menstrual flow and menstrual pain.

## 2018-12-05 NOTE — ASSESSMENT & PLAN NOTE
Patient is a 28-year-old female who is here for follow-up of anxiety.  Initially Celexa 20 mg daily worked quite well for her.  Unfortunately over the last few months she has had near daily panic episodes.  Also having sexual dysfunction and wonders if there is a better medication option for her.

## 2018-12-05 NOTE — PROGRESS NOTES
[facility]      Primary Care Office Visit        History:     Isabella Regalado is a 28 y.o. female who is here today for well woman visit.     HPI: No concerns today, denies change in discharge, painful coitus, abnormal bleeding. Some discomfort with intercourse. No new partners.     Patient's last menstrual period was 11/27/2018.  Reports normal periods, heavy and painful, better on depo    Last PAP normal, done in     Does BSE, no masses noted    No family history of breast cancer, cervical cancer  No GYN surgical history    Sexual health:   + sexually active  Contraception - resume depo   HIV/STI testing - declines         Anxiety  Patient is a 28-year-old female who is here for follow-up of anxiety.  Initially Celexa 20 mg daily worked quite well for her.  Unfortunately over the last few months she has had near daily panic episodes.  Also having sexual dysfunction and wonders if there is a better medication option for her.    Encounter for surveillance of injectable contraceptive  Patient reports having some sexual dysfunction, she saw due to her Depo-Provera injections so she stopped taking it times 2 months.  Sexual dysfunction did not improve.  We did discuss that this is likely related to her SSRI.  She would like to resume Depo today as this helped significantly with menstrual flow and menstrual pain.        Past Medical History:   Diagnosis Date   • Anxiety      Past Surgical History:   Procedure Laterality Date   • DENTAL EXTRACTION(S)       Social History     Social History   • Marital status:      Spouse name: N/A   • Number of children: N/A   • Years of education: N/A     Occupational History   • Not on file.     Social History Main Topics   • Smoking status: Never Smoker   • Smokeless tobacco: Never Used   • Alcohol use No   • Drug use: No   • Sexual activity: Yes     Birth control/ protection: Injection     Other Topics Concern   • Not on file     Social History Narrative   • No  "narrative on file     History   Smoking Status   • Never Smoker   Smokeless Tobacco   • Never Used     Family History   Problem Relation Age of Onset   • Psychiatry Mother         bipolar depression    • Other Mother         parkinsons    • Heart Disease Father         26 - congenital heart defect    • Other Sister         marfans    • Other Brother         marfans      Allergies   Allergen Reactions   • Acyclovir    • Pcn [Penicillins]    • Tetracycline        Problem List:     Patient Active Problem List    Diagnosis Date Noted   • Health care maintenance 06/05/2018   • Encounter for surveillance of injectable contraceptive 05/07/2018   • Anxiety 03/15/2018   • Family history of congenital heart disease in father 03/15/2018         Medications:       Current Outpatient Prescriptions:   •  Vilazodone HCl 20 MG Tab, Take 20 mg by mouth every day., Disp: 30 Tab, Rfl: 5  •  hydrOXYzine HCl (ATARAX) 25 MG Tab, Take 1/2 to 1 pill up to 3 times daily as needed, Disp: 45 Tab, Rfl: 0      Review of Systems:     Pertinent positives noted below, all others reviewed and found to be negative/WNL     CONST: No fevers, weakness, dizziness, unexplained weight loss.  CV: chest pain, palpitations, orthopnea, PND  PULM: dyspnea, wheezing, cough, sputum production, hemoptysis  GI: nausea, vomiting, abdominal pain, greasy stools, blood in stool, diarrhea, constipation  : dysuria, hematuria, change in urine, urinary frequency, urinary urgency  MS: muscle/joint pain, joint swelling  SKIN: rashes, skin changes  GYN ROS: No abnormal bleeding, no pelvic pain, no change in vaginal discharge, no breast masses, no dyspareunia, no hot flashes     Physical Assessment:     VS:  /60 (BP Location: Left arm, Patient Position: Sitting)   Pulse 83   Temp 37.3 °C (99.1 °F) (Temporal)   Resp 18   Ht 1.626 m (5' 4\")   Wt 62.6 kg (138 lb)   LMP 11/27/2018   SpO2 97%   BMI 23.69 kg/m²     General:  Well-developed, well-nourished, NAD, " "female   Skin: No rashes noted.  Warm, dry, intact. No scaling or lesions noted.  Psych: Dressed appropriately for the weather, pleasant, and conversant.  Affect, mood & judgment appropriate.    Breast Exam: Performed with instruction during examination. No axillary lymphadenopathy, no skin changes, no dominant masses. No nipple retraction  Pelvic Exam -  Normal external genitalia with no lesions. Normal vaginal mucosa with normal rugation and no discharge. Cervix with small scattered petechiae and purpura, suggestive of trichomoniasis.  Mild cervical motion tenderness. Uterus is normal sized with no masses. No adnexal tenderness or enlargement appreciated.     Thin Prep Pap is obtained, vaginal swab is obtained and specimen(s) sent to lab        Assessment/Plan:   Isabella was seen today for gynecologic exam and depression.    Diagnoses and all orders for this visit:    Well woman exam with routine gynecological exam  - discussed cervical findings with the patient, concern for trich though she does not have any vaginal complaints other then discomfort with intercourse. Will await pathology and AFFIRM.     /Education:  - reinforced safe sex practices, barrier methods to prevent STI  - importance of regular exercise, weight bearing exercise, vitamin D & calcium  - \"breast self awareness\" vs BSE     Anxiety, uncontrolled   - anxiety is uncontrolled on celexa and now with sexual dysfunction, trial viibryd. Follow up on mychart in 6 weeks, OV in 3 months, sooner if needed.   -     Vilazodone HCl 20 MG Tab; Take 20 mg by mouth every day.    Encounter for surveillance of injectable contraceptive  - urine preg neg, resume depo   -     medroxyPROGESTERone (DEPO-PROVERA) injection 150 mg; 1 mL by Intramuscular route Once.  -     POCT Pregnancy      All questions answered, patient is in agreement with the above plan of care and verbalizes understanding.     Please note that this dictation was created using voice " recognition software. I have made every reasonable attempt to correct obvious errors, but I expect that there are errors of grammar and possibly content that I did not discover before finalizing the note.      BEBE Degroot  12/5/2018, 11:26 AM

## 2018-12-06 DIAGNOSIS — N94.9 CERVICAL MOTION TENDERNESS: ICD-10-CM

## 2018-12-06 LAB
CANDIDA DNA VAG QL PROBE+SIG AMP: NEGATIVE
G VAGINALIS DNA VAG QL PROBE+SIG AMP: NEGATIVE
T VAGINALIS DNA VAG QL PROBE+SIG AMP: NEGATIVE

## 2018-12-07 ENCOUNTER — PATIENT MESSAGE (OUTPATIENT)
Dept: MEDICAL GROUP | Facility: PHYSICIAN GROUP | Age: 28
End: 2018-12-07

## 2018-12-07 DIAGNOSIS — F41.0 PANIC ATTACKS: ICD-10-CM

## 2018-12-07 NOTE — TELEPHONE ENCOUNTER
From: Isabella Regalado  To: JOSH Pop  Sent: 12/7/2018 5:20 AM PST  Subject: Prescription Question    Good morning,   I'm still waking up with anxiety, its not like a full blown attack but I can feel it start. Im currently sitting trying to just breathe and calm myself down before it gets to that. I'm just so tired from constantly waking up because of this. I have zero issues falling asleep I just can't stay asleep all night without getting anxiety. I do smoke weed and that does help to calm my anxiety most of the time, as long as it's not a full attack.

## 2018-12-09 LAB
C TRACH DNA GENITAL QL NAA+PROBE: NEGATIVE
CYTOLOGY REG CYTOL: NORMAL
N GONORRHOEA DNA GENITAL QL NAA+PROBE: NEGATIVE
SPECIMEN SOURCE: NORMAL

## 2018-12-10 ENCOUNTER — PATIENT MESSAGE (OUTPATIENT)
Dept: MEDICAL GROUP | Facility: PHYSICIAN GROUP | Age: 28
End: 2018-12-10

## 2018-12-10 RX ORDER — LORAZEPAM 0.5 MG/1
.25-.5 TABLET ORAL
Qty: 15 TAB | Refills: 0 | Status: SHIPPED | OUTPATIENT
Start: 2018-12-10 | End: 2019-01-17 | Stop reason: SDUPTHER

## 2018-12-10 NOTE — TELEPHONE ENCOUNTER
From: Isabella Regalado  To: JOSH Pop  Sent: 12/10/2018 9:03 AM PST  Subject: RE: Prescription Question    Ok thank you, how much is this prescription?     ----- Message -----  From: JOSH Pop  Sent: 12/10/18 9:01 AM  To: Isabella Regalado  Subject: RE: Prescription Question    I can give you a small number of short acting medication to use when you wake up like this but they cannot be combined with marijuana or alcohol.   You can put the tablet under the tongue and let it dissolve. It will kick in within about 20 minutes. This medication is not safe for every day use. I will give you 15 tablets. If you need a refill in the future it will need to be done in an office visit because of state and federal regulations. The rx is at the office for .     ----- Message -----   From: Isabella Regalado   Sent: 12/7/2018 5:20 AM PST   To: JOSH Pop  Subject: Prescription Question    Good morning,   I'm still waking up with anxiety, its not like a full blown attack but I can feel it start. Im currently sitting trying to just breathe and calm myself down before it gets to that. I'm just so tired from constantly waking up because of this. I have zero issues falling asleep I just can't stay asleep all night without getting anxiety. I do smoke weed and that does help to calm my anxiety most of the time, as long as it's not a full attack.

## 2019-01-17 ENCOUNTER — PATIENT MESSAGE (OUTPATIENT)
Dept: MEDICAL GROUP | Facility: PHYSICIAN GROUP | Age: 29
End: 2019-01-17

## 2019-01-17 DIAGNOSIS — F41.0 PANIC ATTACKS: ICD-10-CM

## 2019-01-17 RX ORDER — LORAZEPAM 0.5 MG/1
.25-.5 TABLET ORAL
Qty: 15 TAB | Refills: 0 | Status: SHIPPED | OUTPATIENT
Start: 2019-01-17 | End: 2019-02-01

## 2019-01-28 ENCOUNTER — PATIENT MESSAGE (OUTPATIENT)
Dept: MEDICAL GROUP | Facility: PHYSICIAN GROUP | Age: 29
End: 2019-01-28

## 2019-01-28 DIAGNOSIS — N94.10 DYSPAREUNIA, FEMALE: ICD-10-CM

## 2019-01-28 NOTE — TELEPHONE ENCOUNTER
From: Isabella Regalado  To: JOSH Pop  Sent: 1/28/2019 10:47 AM PST  Subject: RE: Non-Urgent Medical Question    Blood test?    ----- Message -----  From: JOSH Pop  Sent: 1/28/19 10:45 AM  To: Isabella Regalado  Subject: RE: Non-Urgent Medical Question    We should also rule out infection. I have placed an order in the computer to have you come by the lab to screen for gonorrhea and chlamydia.     ----- Message -----   From: Isabella Regalado   Sent: 1/28/2019 10:33 AM PST   To: JOSH Pop  Subject: Non-Urgent Medical Question    Good morning,   I'm messaging because I'm still having bad pain during sex too the point that I can't continue and I bleed during and then after for a couple days. I'm hoping we can do something or see what's going on here because this is all within the last year and now its too painful. :(

## 2019-01-29 ENCOUNTER — HOSPITAL ENCOUNTER (OUTPATIENT)
Dept: LAB | Facility: MEDICAL CENTER | Age: 29
End: 2019-01-29
Attending: NURSE PRACTITIONER
Payer: COMMERCIAL

## 2019-01-29 DIAGNOSIS — N94.10 DYSPAREUNIA, FEMALE: ICD-10-CM

## 2019-01-29 PROCEDURE — 87510 GARDNER VAG DNA DIR PROBE: CPT

## 2019-01-29 PROCEDURE — 87591 N.GONORRHOEAE DNA AMP PROB: CPT

## 2019-01-29 PROCEDURE — 87491 CHLMYD TRACH DNA AMP PROBE: CPT

## 2019-01-29 PROCEDURE — 87660 TRICHOMONAS VAGIN DIR PROBE: CPT

## 2019-01-29 PROCEDURE — 87480 CANDIDA DNA DIR PROBE: CPT

## 2019-01-30 LAB
C TRACH DNA SPEC QL NAA+PROBE: NEGATIVE
N GONORRHOEA DNA SPEC QL NAA+PROBE: NEGATIVE
SPECIMEN SOURCE: NORMAL

## 2019-02-11 ENCOUNTER — PATIENT MESSAGE (OUTPATIENT)
Dept: MEDICAL GROUP | Facility: PHYSICIAN GROUP | Age: 29
End: 2019-02-11

## 2019-02-12 RX ORDER — TRAZODONE HYDROCHLORIDE 50 MG/1
50-150 TABLET ORAL
Qty: 90 TAB | Refills: 1 | Status: SHIPPED | OUTPATIENT
Start: 2019-02-12 | End: 2019-05-23

## 2019-02-12 NOTE — TELEPHONE ENCOUNTER
From: Isabella Regalado  To: Shaina Wilson [facility]  Sent: 2019 5:45 AM PST  Subject: RE: RE: Prescription Question    I've gone through that whole prescription and it has not helped me sleep, maybe one or 2 nights it did. I have been up every couple of hours fell asleep at 10 up at 12 then fell asleep then back up at 3 then began to sleep now im up again. Every time I'm walking up its with anxiety. Im calling out of work today hoping I can get in to either see you or get something to help me sleep.     ----- Message -----  From: Shaina Wilson [facility]'t  Sent: 19 10:57 AM  To: Isabella Regalado  Subject: RE: RE: Prescription Question    The medication that was sent to your pharmacy in December was Ativan. Unfortunately that prescription  on 18. We will need to forward a request to Darlyn for her to send in a new RX for this medication.     Please confirm that you would like this prescription send to [facility] here in [place].     Thank you    ----- Message -----   From: Isabella Regalado   Sent: 2019 10:25 AM PST   To: JOSH Pop  Subject: RE: RE: Prescription Question    That was the one. I never got the chance to go in and pick it up.     ----- Message -----  From: Shaina [facility]  Sent: 19 10:17 AM  To: Isaeblla Regalado  Subject: RE: Prescription Question    Mrs. Regalado,    Which prescription were you inquiring about? The last medication prescribed by our office was just before [place].     Thank You  The office of Darlyn Evans.    ----- Message -----   From: Isabella Regalado   Sent: 2019 9:51 AM PST   To: JOSH Pop  Subject: Prescription Question    Good morning   I've been so busy I have yet to go  that prescription is there anyway I can still come in today to pick it up?

## 2019-02-20 ENCOUNTER — NON-PROVIDER VISIT (OUTPATIENT)
Dept: MEDICAL GROUP | Facility: PHYSICIAN GROUP | Age: 29
End: 2019-02-20
Payer: COMMERCIAL

## 2019-02-20 DIAGNOSIS — Z30.09 BIRTH CONTROL COUNSELING: ICD-10-CM

## 2019-02-20 PROCEDURE — 99999 PR NO CHARGE: CPT | Performed by: NURSE PRACTITIONER

## 2019-02-20 PROCEDURE — 96372 THER/PROPH/DIAG INJ SC/IM: CPT | Performed by: NURSE PRACTITIONER

## 2019-02-20 RX ORDER — MEDROXYPROGESTERONE ACETATE 150 MG/ML
150 INJECTION, SUSPENSION INTRAMUSCULAR ONCE
Status: COMPLETED | OUTPATIENT
Start: 2019-02-20 | End: 2019-02-20

## 2019-02-20 RX ADMIN — MEDROXYPROGESTERONE ACETATE 150 MG: 150 INJECTION, SUSPENSION INTRAMUSCULAR at 13:13

## 2019-02-20 NOTE — NON-PROVIDER
Isabella Regalado is a 28 y.o. here for a Depo Provera Injection.     Date of last Depo Provera Injection:12/5/18  Current date within therapeutic range?: Yes   Urine pregnancy test done (needed if out of date range): no  Date of office visit:12/5/18  Date of last pap (if > 21 years old)/ GYN exam: MA  Dx: Contraceptive use    Order and dose verified by: Darlyn  Patient tolerated injection and no adverse effects were observed or reported: Yes    # of Administrations remaining in MAR: 0  Next injection due between May 5th and May 22nd.

## 2019-05-23 ENCOUNTER — OFFICE VISIT (OUTPATIENT)
Dept: MEDICAL GROUP | Facility: PHYSICIAN GROUP | Age: 29
End: 2019-05-23
Payer: COMMERCIAL

## 2019-05-23 VITALS
RESPIRATION RATE: 14 BRPM | SYSTOLIC BLOOD PRESSURE: 110 MMHG | DIASTOLIC BLOOD PRESSURE: 52 MMHG | WEIGHT: 135 LBS | HEART RATE: 66 BPM | BODY MASS INDEX: 23.05 KG/M2 | OXYGEN SATURATION: 96 % | TEMPERATURE: 98 F | HEIGHT: 64 IN

## 2019-05-23 DIAGNOSIS — Z30.42 ENCOUNTER FOR SURVEILLANCE OF INJECTABLE CONTRACEPTIVE: ICD-10-CM

## 2019-05-23 DIAGNOSIS — Z13.21 ENCOUNTER FOR VITAMIN DEFICIENCY SCREENING: ICD-10-CM

## 2019-05-23 DIAGNOSIS — Z13.6 SCREENING FOR CARDIOVASCULAR CONDITION: ICD-10-CM

## 2019-05-23 DIAGNOSIS — Z82.79 FAMILY HISTORY OF CONGENITAL HEART DISEASE IN FATHER: ICD-10-CM

## 2019-05-23 DIAGNOSIS — F41.9 ANXIETY: ICD-10-CM

## 2019-05-23 DIAGNOSIS — R10.2 SUPRAPUBIC DISCOMFORT: ICD-10-CM

## 2019-05-23 DIAGNOSIS — Z30.09 BIRTH CONTROL COUNSELING: ICD-10-CM

## 2019-05-23 DIAGNOSIS — Z13.29 SCREENING FOR THYROID DISORDER: ICD-10-CM

## 2019-05-23 LAB
INT CON NEG: NEGATIVE
INT CON POS: POSITIVE
POC URINE PREGNANCY TEST: NORMAL

## 2019-05-23 PROCEDURE — 81025 URINE PREGNANCY TEST: CPT | Performed by: NURSE PRACTITIONER

## 2019-05-23 PROCEDURE — 96372 THER/PROPH/DIAG INJ SC/IM: CPT | Performed by: NURSE PRACTITIONER

## 2019-05-23 PROCEDURE — 99214 OFFICE O/P EST MOD 30 MIN: CPT | Mod: 25 | Performed by: NURSE PRACTITIONER

## 2019-05-23 RX ORDER — VILAZODONE HYDROCHLORIDE 20 MG/1
20 TABLET ORAL DAILY
Qty: 30 TAB | Refills: 5 | Status: SHIPPED | OUTPATIENT
Start: 2019-05-23 | End: 2019-07-16 | Stop reason: CLARIF

## 2019-05-23 RX ORDER — HYDROXYZINE HYDROCHLORIDE 25 MG/1
TABLET, FILM COATED ORAL
Qty: 45 TAB | Refills: 0 | Status: SHIPPED | OUTPATIENT
Start: 2019-05-23

## 2019-05-23 RX ORDER — VILAZODONE HYDROCHLORIDE 20 MG/1
TABLET ORAL
COMMUNITY
End: 2019-05-23

## 2019-05-23 RX ORDER — MEDROXYPROGESTERONE ACETATE 150 MG/ML
150 INJECTION, SUSPENSION INTRAMUSCULAR ONCE
Status: COMPLETED | OUTPATIENT
Start: 2019-05-23 | End: 2019-05-23

## 2019-05-23 RX ADMIN — MEDROXYPROGESTERONE ACETATE 150 MG: 150 INJECTION, SUSPENSION INTRAMUSCULAR at 13:56

## 2019-05-23 ASSESSMENT — PATIENT HEALTH QUESTIONNAIRE - PHQ9: CLINICAL INTERPRETATION OF PHQ2 SCORE: 0

## 2019-05-23 NOTE — PROGRESS NOTES
"  CC: Establish care, anxiety    HISTORY OF THE PRESENT ILLNESS: Patient is a 28 y.o. female. This pleasant patient is here today to establish care and for evaluation management following health problems.  Patient's previous primary care provider is Darlyn Evans.        Anxiety  Chronic health problem.  Started Vilazodone 20 mg about 5 months ago.  Reports working much better than Celexa was working, which caused her to feel \"blah.\"  She reports she is less anxious and not as worried all the time.  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  She does say that she is using CBD oil with THC a few times a week with good relief of anxiety and with sleep issue.  She does report waking about 4 hours after bedtime and being wide awake.  Has tried taking Vilazodone in morning and evening without difference. Was having issues with sleep prior to starting Vilazodone.  Reviewed good sleep hygiene.  Does snore and wakes coughing at times.  Consider sleep study.  Will trial hydroxizine at bedtime.  Denies SI/HI.      Family history of congenital heart disease in father  Patient reports family history and father of congenital heart defect, coarctation of the aorta.  Father  at age 26.  She was seeing cardiology annually up until the age of 17.  Was getting annual echocardiograms.  Patient is wondering if she should establish with cardiology as her sister has also started seeing a cardiologist.  She does report occasional palpitations.  Denies syncope or presyncope, chest pain, fatigue, shortness of breath.  Will refer to cardiology for evaluation and will order echocardiogram.      Encounter for surveillance of injectable contraceptive  Patient wishing to have Provera injection today.  She is 1 day past due.  Will get point-of-care pregnancy test.    Suprapubic discomfort  Patient reports random sharp pains in suprapubic area.  She has consulted with gynecology, who has referred " "her to urology.  Pending appointment with urology.  Patient denies dysuria, dyspareunia, flank pain.      Allergies: Acyclovir; Pcn [penicillins]; and Tetracycline    Current Outpatient Prescriptions Ordered in [facility]   Medication Sig Dispense Refill   • hydrOXYzine HCl (ATARAX) 25 MG Tab Take 1/2 to 1 pill up to 3 times daily as needed 45 Tab 0   • Vilazodone HCl 20 MG Tab Take 20 mg by mouth every day. 30 Tab 5     Current Facility-Administered Medications Ordered in Epic   Medication Dose Route Frequency Provider Last Rate Last Dose   • medroxyPROGESTERone (DEPO-PROVERA) injection 150 mg  150 mg Intramuscular Once Eden Ji, A.P.R.N.           Past Medical History:   Diagnosis Date   • Anxiety        Past Surgical History:   Procedure Laterality Date   • DENTAL EXTRACTION(S)         Social History   Substance Use Topics   • Smoking status: Never Smoker   • Smokeless tobacco: Never Used   • Alcohol use No       Family History   Problem Relation Age of Onset   • Psychiatry Mother         bipolar depression    • Other Mother         parkinsons    • Heart Disease Father         26 - congenital heart defect    • Other Sister         marfans    • Other Brother         marfans        ROS:   As in HPI, otherwise negative for chest pain, dyspnea, abdominal pain, dysuria, blood in stool, fever               Exam: /52 (BP Location: Right arm, Patient Position: Sitting, BP Cuff Size: Adult)   Pulse 66   Temp 36.7 °C (98 °F)   Resp 14   Ht 1.626 m (5' 4\")   Wt 61.2 kg (135 lb)   SpO2 96%  Body mass index is 23.17 kg/m².    General: Alert, pleasant, well nourished, well developed female in NAD  HEENT: Normocephalic. Eyes conjunctiva clear lids without ptosis, pupils equal and reactive to light, ears normal shape and contour, canals are clear bilaterally, tympanic membranes are pearly gray with good light reflex, nasal mucosa without erythema and drainage, oropharynx is without erythema, edema or exudates. "   Neck: Supple without bruit. Thyroid is not enlarged.  Pulmonary: Clear to ausculation.  Normal effort. No rales, ronchi, or wheezing.  Cardiovascular: Normal rate and rhythm without murmur. Carotid and radial pulses are intact and equal bilaterally.  No lower extremity edema.  Abdomen: Soft, nontender, nondistended. Normal bowel sounds. Liver and spleen are not palpable  Neurologic: Grossly nonfocal  Lymph: No cervical or supraclavicular lymph nodes are palpable  Skin: Warm and dry.  No obvious lesions.  Musculoskeletal: Normal gait.   Psych: Normal mood and affect. Alert and oriented. Judgment and insight is normal.    Please note that this dictation was created using voice recognition software. I have made every reasonable attempt to correct obvious errors, but I expect that there are errors of grammar and possibly content that I did not discover before finalizing the note.      Assessment/Plan  1. Anxiety  Continue with Vilazodone daily.  Will add hydroxyzine to take as needed for anxiety and for sleep.  Consider sleep study if continues to wake at night.  - hydrOXYzine HCl (ATARAX) 25 MG Tab; Take 1/2 to 1 pill up to 3 times daily as needed  Dispense: 45 Tab; Refill: 0  - Vilazodone HCl 20 MG Tab; Take 20 mg by mouth every day.  Dispense: 30 Tab; Refill: 5  - TSH; Future  - FREE THYROXINE; Future    2. Family history of congenital heart disease in father  We will get echocardiogram and referred to cardiology.  Patient asymptomatic.  - EC-ECHOCARDIOGRAM COMPLETE W/O CONT; Future  - REFERRAL TO CARDIOLOGY    3. Encounter for surveillance of injectable contraceptive  Point-of-care pregnancy test negative.  Depo-Provera injection given today.  - POCT Pregnancy  - medroxyPROGESTERone (DEPO-PROVERA) injection 150 mg; 1 mL by Intramuscular route Once.    4. Screening for cardiovascular condition  We will notify patient of lab results via [employer].  - Comp Metabolic Panel; Future    5. Screening for thyroid  disorder  We will notify patient of lab results via [employer].  - TSH; Future  - FREE THYROXINE; Future    6. Encounter for vitamin deficiency screening  We will notify patient of results via [employer].  - VITAMIN D,25 HYDROXY; Future    7. Birth control counseling      8. Suprapubic discomfort  Continue follow-up with gynecology and urology.    Patient will have lab work done and I will notify her of lab results via my chart.  She will return to clinic in 6 months for anxiety or sooner if needed.

## 2019-05-23 NOTE — ASSESSMENT & PLAN NOTE
Patient reports random sharp pains in suprapubic area.  She has consulted with gynecology, who has referred her to urology.  Pending appointment with urology.  Patient denies dysuria, dyspareunia, flank pain.

## 2019-05-23 NOTE — ASSESSMENT & PLAN NOTE
Patient reports family history and father of congenital heart defect, coarctation of the aorta.  Father  at age 26.  She was seeing cardiology annually up until the age of 17.  Was getting annual echocardiograms.  Patient is wondering if she should establish with cardiology as her sister has also started seeing a cardiologist.  She does report occasional palpitations.  Denies syncope or presyncope, chest pain, fatigue, shortness of breath.  Will refer to cardiology for evaluation and will order echocardiogram.

## 2019-05-23 NOTE — ASSESSMENT & PLAN NOTE
"Chronic health problem.  Started Vilazodone 20 mg about 5 months ago.  Reports working much better than Celexa was working, which caused her to feel \"blah.\"  She reports she is less anxious and not as worried all the time.  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  She does say that she is using CBD oil with THC a few times a week with good relief of anxiety and with sleep issue.  She does report waking about 4 hours after bedtime and being wide awake.  Has tried taking Vilazodone in morning and evening without difference. Was having issues with sleep prior to starting Vilazodone.  Reviewed good sleep hygiene.  Does snore and wakes coughing at times.  Consider sleep study.  Will trial hydroxizine at bedtime.  Denies SI/HI.    "

## 2019-05-23 NOTE — ASSESSMENT & PLAN NOTE
Patient wishing to have Provera injection today.  She is 1 day past due.  Will get point-of-care pregnancy test.

## 2019-05-31 ENCOUNTER — TELEPHONE (OUTPATIENT)
Dept: MEDICAL GROUP | Facility: PHYSICIAN GROUP | Age: 29
End: 2019-05-31

## 2019-06-01 NOTE — TELEPHONE ENCOUNTER
The patient has be scheduled for a new patient telehealth visit with cardiology on 6/27/2019 @ 2:00pm with Dr. Peralta at the [facility].  Please order an EKG and have patient coplete 1-2 days prior to the visit or at the time of check in. Once the EKG is completed, please email or fax the report to [phone].

## 2019-06-18 DIAGNOSIS — Z82.79 FAMILY HISTORY OF CONGENITAL HEART DEFECT: ICD-10-CM

## 2019-07-11 ENCOUNTER — TELEPHONE (OUTPATIENT)
Dept: MEDICAL GROUP | Facility: PHYSICIAN GROUP | Age: 29
End: 2019-07-11

## 2019-07-11 DIAGNOSIS — F41.9 ANXIETY: ICD-10-CM

## 2019-07-11 NOTE — TELEPHONE ENCOUNTER
1. Caller Name: Alysa                      Call Back Number: [phone]    2. Message: Received a fax from Pt's pharmacy stating that her insurance will not cover the Viibryd but will the following:  Escitalopram Oxalate  Venlafaxine HCI ER  Duloxetine HCI  Citalopram Hydrobromide    Do you want to change Pt's Rx. Please advise.    3. Patient approves office to leave a detailed voicemail/MyChart message: N\A

## 2019-07-12 NOTE — TELEPHONE ENCOUNTER
Can we do a prior auth?  Patient has been taking the Vilazodone for 5 months with good results.  She tried and failed citalopram.

## 2019-07-16 RX ORDER — DULOXETIN HYDROCHLORIDE 60 MG/1
CAPSULE, DELAYED RELEASE ORAL
Qty: 30 CAP | Refills: 5 | Status: SHIPPED | OUTPATIENT
Start: 2019-07-16

## 2019-07-16 NOTE — TELEPHONE ENCOUNTER
Pharmacy to do 30mg for 1 week then increase to 60mg as they only have capsules not tabs and can not split

## 2019-07-16 NOTE — TELEPHONE ENCOUNTER
Please notify patient of JAMI gloria.  Will prescribe duloxetine.  Start with 30 mg daily for 1 week, then increase to 60 mg a day thereafter.  Instruct patient to decrease Vilazodone to 1/2 tab a day during first week of duloxetine, then go down to every other day for a week, then discontinue.

## 2019-09-05 ENCOUNTER — NON-PROVIDER VISIT (OUTPATIENT)
Dept: MEDICAL GROUP | Facility: PHYSICIAN GROUP | Age: 29
End: 2019-09-05
Payer: COMMERCIAL

## 2019-09-05 DIAGNOSIS — Z30.42 ENCOUNTER FOR SURVEILLANCE OF INJECTABLE CONTRACEPTIVE: ICD-10-CM

## 2019-09-05 DIAGNOSIS — Z30.40 ENCOUNTER FOR SURVEILLANCE OF CONTRACEPTIVES, UNSPECIFIED CONTRACEPTIVE: ICD-10-CM

## 2019-09-05 LAB
INT CON NEG: NEGATIVE
INT CON POS: POSITIVE
POC URINE PREGNANCY TEST: NORMAL

## 2019-09-05 PROCEDURE — 96372 THER/PROPH/DIAG INJ SC/IM: CPT | Performed by: NURSE PRACTITIONER

## 2019-09-05 PROCEDURE — 81025 URINE PREGNANCY TEST: CPT | Performed by: NURSE PRACTITIONER

## 2019-09-05 RX ORDER — MEDROXYPROGESTERONE ACETATE 150 MG/ML
150 INJECTION, SUSPENSION INTRAMUSCULAR ONCE
Status: COMPLETED | OUTPATIENT
Start: 2019-09-05 | End: 2019-09-05

## 2019-09-05 RX ADMIN — MEDROXYPROGESTERONE ACETATE 150 MG: 150 INJECTION, SUSPENSION INTRAMUSCULAR at 10:36

## 2019-09-05 NOTE — NON-PROVIDER
Isabella Regalado is a 29 y.o. here for a Depo Provera Injection.     Date of last Depo Provera Injection: 5/23/19  Current date within therapeutic range?: No   Urine pregnancy test done (needed if out of date range): yes--NEG  Date of office visit:5/23/19  Date of last pap (if > 21 years old)/ GYN exam: 12/5/18  Dx: Contraceptive use    Order and dose verified by: Garrison  Patient tolerated injection and no adverse effects were observed or reported: No    # of Administrations remaining in MAR: 0  Next injection due between 9/21/19 and 12/6/19.